# Patient Record
Sex: MALE | Race: WHITE | NOT HISPANIC OR LATINO | Employment: FULL TIME | URBAN - METROPOLITAN AREA
[De-identification: names, ages, dates, MRNs, and addresses within clinical notes are randomized per-mention and may not be internally consistent; named-entity substitution may affect disease eponyms.]

---

## 2023-10-26 PROBLEM — H00.013 HORDEOLUM OF RIGHT EYE: Status: ACTIVE | Noted: 2023-10-26

## 2023-10-26 PROBLEM — N52.9 MALE ERECTILE DISORDER: Status: ACTIVE | Noted: 2023-10-26

## 2023-10-26 PROBLEM — L02.421 FURUNCLE OF RIGHT AXILLA: Status: ACTIVE | Noted: 2023-10-26

## 2023-10-26 PROBLEM — N40.1 BPH WITH URINARY OBSTRUCTION: Status: ACTIVE | Noted: 2023-10-26

## 2023-10-26 PROBLEM — R35.1 NOCTURIA: Status: ACTIVE | Noted: 2023-10-26

## 2023-10-26 PROBLEM — N13.8 BPH WITH URINARY OBSTRUCTION: Status: ACTIVE | Noted: 2023-10-26

## 2023-10-26 PROBLEM — H01.003 BLEPHARITIS OF BOTH EYES: Status: ACTIVE | Noted: 2023-10-26

## 2023-10-26 PROBLEM — H01.006 BLEPHARITIS OF BOTH EYES: Status: ACTIVE | Noted: 2023-10-26

## 2023-10-26 PROBLEM — H00.021 HORDEOLUM INTERNUM OF RIGHT UPPER EYELID: Status: ACTIVE | Noted: 2023-10-26

## 2023-10-26 PROBLEM — N02.9 RECURRENT MICROSCOPIC HEMATURIA: Status: ACTIVE | Noted: 2023-10-26

## 2023-10-26 PROBLEM — L25.9 CONTACT DERMATITIS: Status: ACTIVE | Noted: 2023-10-26

## 2023-10-26 PROBLEM — R33.8 ACUTE URINARY RETENTION: Status: ACTIVE | Noted: 2023-10-26

## 2023-10-26 PROBLEM — H69.91 DYSFUNCTION OF RIGHT EUSTACHIAN TUBE: Status: ACTIVE | Noted: 2023-10-26

## 2023-10-26 RX ORDER — SILODOSIN 8 MG/1
8 CAPSULE ORAL NIGHTLY
COMMUNITY
Start: 2020-12-01

## 2023-10-26 RX ORDER — OMEPRAZOLE 20 MG/1
20 CAPSULE, DELAYED RELEASE ORAL
COMMUNITY
Start: 2022-03-26

## 2023-10-26 RX ORDER — DEXTROAMPHETAMINE SACCHARATE, AMPHETAMINE ASPARTATE MONOHYDRATE, DEXTROAMPHETAMINE SULFATE AND AMPHETAMINE SULFATE 6.25; 6.25; 6.25; 6.25 MG/1; MG/1; MG/1; MG/1
25 CAPSULE, EXTENDED RELEASE ORAL
COMMUNITY
Start: 2020-12-23

## 2023-10-26 RX ORDER — DEXTROAMPHETAMINE SACCHARATE, AMPHETAMINE ASPARTATE, DEXTROAMPHETAMINE SULFATE AND AMPHETAMINE SULFATE 7.5; 7.5; 7.5; 7.5 MG/1; MG/1; MG/1; MG/1
30 TABLET ORAL DAILY
COMMUNITY
Start: 2020-11-23

## 2023-10-26 RX ORDER — AMLODIPINE BESYLATE 5 MG/1
5 TABLET ORAL
COMMUNITY
Start: 2017-09-13

## 2023-10-26 RX ORDER — METFORMIN HYDROCHLORIDE 500 MG/1
2 TABLET, EXTENDED RELEASE ORAL DAILY
COMMUNITY
Start: 2017-05-17

## 2023-10-26 RX ORDER — TADALAFIL 5 MG/1
1 TABLET ORAL DAILY
COMMUNITY
Start: 2019-12-16 | End: 2023-10-27 | Stop reason: SDUPTHER

## 2023-10-26 RX ORDER — TESTOSTERONE CYPIONATE 200 MG/ML
0.7 INJECTION, SOLUTION INTRAMUSCULAR
COMMUNITY
Start: 2022-03-29

## 2023-10-26 RX ORDER — AMLODIPINE BESYLATE 10 MG/1
10 TABLET ORAL
COMMUNITY
Start: 2020-11-04

## 2023-10-27 ENCOUNTER — OFFICE VISIT (OUTPATIENT)
Dept: UROLOGY | Facility: CLINIC | Age: 58
End: 2023-10-27
Payer: COMMERCIAL

## 2023-10-27 VITALS
WEIGHT: 234.4 LBS | BODY MASS INDEX: 33.56 KG/M2 | TEMPERATURE: 97.1 F | DIASTOLIC BLOOD PRESSURE: 76 MMHG | SYSTOLIC BLOOD PRESSURE: 125 MMHG | HEART RATE: 101 BPM | HEIGHT: 70 IN

## 2023-10-27 DIAGNOSIS — N13.8 BPH WITH URINARY OBSTRUCTION: Primary | ICD-10-CM

## 2023-10-27 DIAGNOSIS — N52.9 MALE ERECTILE DISORDER: ICD-10-CM

## 2023-10-27 DIAGNOSIS — N40.1 BPH WITH URINARY OBSTRUCTION: Primary | ICD-10-CM

## 2023-10-27 PROCEDURE — 1036F TOBACCO NON-USER: CPT | Performed by: NURSE PRACTITIONER

## 2023-10-27 PROCEDURE — 99214 OFFICE O/P EST MOD 30 MIN: CPT | Performed by: NURSE PRACTITIONER

## 2023-10-27 RX ORDER — TADALAFIL 5 MG/1
5 TABLET ORAL DAILY
Qty: 90 TABLET | Refills: 3 | Status: SHIPPED | OUTPATIENT
Start: 2023-10-27 | End: 2024-10-21

## 2023-10-27 RX ORDER — SILODOSIN 8 MG/1
8 CAPSULE ORAL DAILY
Qty: 90 CAPSULE | Refills: 3 | Status: SHIPPED | OUTPATIENT
Start: 2023-10-27 | End: 2024-10-21

## 2023-10-27 RX ORDER — TADALAFIL 20 MG/1
20 TABLET ORAL
Qty: 30 TABLET | Refills: 3 | Status: SHIPPED | OUTPATIENT
Start: 2023-10-27 | End: 2024-06-23

## 2023-10-27 NOTE — PROGRESS NOTES
Subjective   Patient ID: Roscoe Man is a 58 y.o. male who presents for Benign Prostatic Hypertrophy and Follow-up.  Follow up of BPH and ED . He developed postoperative retention in Nov 2020 after surgical resolution of subdural hematoma. He has been compliant with daily silodosin as well as tadalafil. He was doing ISC temporarily which he has not completed since Dec 2020. Significant improvement in strength of stream and ability to empty. Doing well with silodosin AM and tadalafil PM.      Previously having priapism with 10/30/1 up to 30 units. Using 20/30/1 up to 30 units, only lasting up to 40 min. Pleased with quality of erection, desires erection to last longer.     PSA March 2020 4.4 and decreased to 3.8 in March 2021. Managed by age management MD who also manages his testosterone replacement. Currently injecting 0.7 mL weekly. PSA drawn this AM, lab still pending.      CT in 2022 identified small nonobstructing left kidney stone, asymptomatic Microscopic urinalysis identified 3-5 red blood cells per high-powered field. He denies gross hematuria. He has declined cystoscopy.             Review of Systems   All other systems reviewed and are negative.      Objective   Physical Exam  Vitals reviewed.     Alert and oriented x3  Moist mucous membranes  Breathes easily on room air  Abdomen soft, nondistended. Obese  No edema  No scleral icterus  No focal neurological deficits  Appears stated age, no acute distress  Declining LUIZ today     Assessment/Plan   Diagnoses and all orders for this visit:  BPH with urinary obstruction  -     silodosin (Rapaflo) 8 mg capsule; Take 1 capsule (8 mg) by mouth once daily.  -     tadalafil (Cialis) 5 mg tablet; Take 1 tablet (5 mg) by mouth once daily.  -     tadalafil (Cialis) 20 mg tablet; Take 1 tablet (20 mg) by mouth every other day if needed for erectile dysfunction.  Male erectile disorder    Increase ICI to 20/30/2. Continue with other interventions. Will contact me with  PSA result. Annual follow up with LUIZ

## 2024-08-27 ENCOUNTER — HOSPITAL ENCOUNTER (EMERGENCY)
Facility: HOSPITAL | Age: 59
Discharge: HOME | End: 2024-08-27
Attending: EMERGENCY MEDICINE
Payer: COMMERCIAL

## 2024-08-27 ENCOUNTER — APPOINTMENT (OUTPATIENT)
Dept: RADIOLOGY | Facility: HOSPITAL | Age: 59
End: 2024-08-27
Payer: COMMERCIAL

## 2024-08-27 VITALS
HEIGHT: 69 IN | RESPIRATION RATE: 17 BRPM | SYSTOLIC BLOOD PRESSURE: 154 MMHG | TEMPERATURE: 97.5 F | OXYGEN SATURATION: 96 % | HEART RATE: 96 BPM | DIASTOLIC BLOOD PRESSURE: 86 MMHG | WEIGHT: 234 LBS | BODY MASS INDEX: 34.66 KG/M2

## 2024-08-27 DIAGNOSIS — N20.1 URETERAL CALCULUS: ICD-10-CM

## 2024-08-27 DIAGNOSIS — N23 URETERAL COLIC: Primary | ICD-10-CM

## 2024-08-27 LAB
ALBUMIN SERPL BCP-MCNC: 4.2 G/DL (ref 3.4–5)
ALP SERPL-CCNC: 62 U/L (ref 33–136)
ALT SERPL W P-5'-P-CCNC: 22 U/L (ref 10–52)
ANION GAP SERPL CALC-SCNC: 15 MMOL/L (ref 10–20)
APPEARANCE UR: CLEAR
AST SERPL W P-5'-P-CCNC: 12 U/L (ref 9–39)
BASOPHILS # BLD AUTO: 0.04 X10*3/UL (ref 0–0.1)
BASOPHILS NFR BLD AUTO: 0.4 %
BILIRUB SERPL-MCNC: 0.5 MG/DL (ref 0–1.2)
BILIRUB UR STRIP.AUTO-MCNC: NEGATIVE MG/DL
BUN SERPL-MCNC: 23 MG/DL (ref 6–23)
CALCIUM SERPL-MCNC: 9.4 MG/DL (ref 8.6–10.3)
CHLORIDE SERPL-SCNC: 99 MMOL/L (ref 98–107)
CO2 SERPL-SCNC: 26 MMOL/L (ref 21–32)
COLOR UR: ABNORMAL
CREAT SERPL-MCNC: 1.27 MG/DL (ref 0.5–1.3)
EGFRCR SERPLBLD CKD-EPI 2021: 64 ML/MIN/1.73M*2
EOSINOPHIL # BLD AUTO: 0.05 X10*3/UL (ref 0–0.7)
EOSINOPHIL NFR BLD AUTO: 0.5 %
ERYTHROCYTE [DISTWIDTH] IN BLOOD BY AUTOMATED COUNT: 17.8 % (ref 11.5–14.5)
GLUCOSE SERPL-MCNC: 129 MG/DL (ref 74–99)
GLUCOSE UR STRIP.AUTO-MCNC: NORMAL MG/DL
HCT VFR BLD AUTO: 44.4 % (ref 41–52)
HGB BLD-MCNC: 13.8 G/DL (ref 13.5–17.5)
HOLD SPECIMEN: NORMAL
HOLD SPECIMEN: NORMAL
HYALINE CASTS #/AREA URNS AUTO: ABNORMAL /LPF
IMM GRANULOCYTES # BLD AUTO: 0.04 X10*3/UL (ref 0–0.7)
IMM GRANULOCYTES NFR BLD AUTO: 0.4 % (ref 0–0.9)
KETONES UR STRIP.AUTO-MCNC: NEGATIVE MG/DL
LEUKOCYTE ESTERASE UR QL STRIP.AUTO: NEGATIVE
LIPASE SERPL-CCNC: 58 U/L (ref 9–82)
LYMPHOCYTES # BLD AUTO: 3.5 X10*3/UL (ref 1.2–4.8)
LYMPHOCYTES NFR BLD AUTO: 35.4 %
MCH RBC QN AUTO: 19.9 PG (ref 26–34)
MCHC RBC AUTO-ENTMCNC: 31.1 G/DL (ref 32–36)
MCV RBC AUTO: 64 FL (ref 80–100)
MONOCYTES # BLD AUTO: 0.82 X10*3/UL (ref 0.1–1)
MONOCYTES NFR BLD AUTO: 8.3 %
MUCOUS THREADS #/AREA URNS AUTO: ABNORMAL /LPF
NEUTROPHILS # BLD AUTO: 5.44 X10*3/UL (ref 1.2–7.7)
NEUTROPHILS NFR BLD AUTO: 55 %
NITRITE UR QL STRIP.AUTO: NEGATIVE
NRBC BLD-RTO: 0 /100 WBCS (ref 0–0)
PH UR STRIP.AUTO: 6 [PH]
PLATELET # BLD AUTO: 252 X10*3/UL (ref 150–450)
POTASSIUM SERPL-SCNC: 3.8 MMOL/L (ref 3.5–5.3)
PROT SERPL-MCNC: 7.7 G/DL (ref 6.4–8.2)
PROT UR STRIP.AUTO-MCNC: ABNORMAL MG/DL
RBC # BLD AUTO: 6.94 X10*6/UL (ref 4.5–5.9)
RBC # UR STRIP.AUTO: ABNORMAL /UL
RBC #/AREA URNS AUTO: >20 /HPF
SODIUM SERPL-SCNC: 136 MMOL/L (ref 136–145)
SP GR UR STRIP.AUTO: 1.03
UROBILINOGEN UR STRIP.AUTO-MCNC: NORMAL MG/DL
WBC # BLD AUTO: 9.9 X10*3/UL (ref 4.4–11.3)
WBC #/AREA URNS AUTO: ABNORMAL /HPF

## 2024-08-27 PROCEDURE — 74176 CT ABD & PELVIS W/O CONTRAST: CPT | Performed by: RADIOLOGY

## 2024-08-27 PROCEDURE — 99284 EMERGENCY DEPT VISIT MOD MDM: CPT

## 2024-08-27 PROCEDURE — 36415 COLL VENOUS BLD VENIPUNCTURE: CPT | Performed by: EMERGENCY MEDICINE

## 2024-08-27 PROCEDURE — 74176 CT ABD & PELVIS W/O CONTRAST: CPT

## 2024-08-27 PROCEDURE — 83690 ASSAY OF LIPASE: CPT | Performed by: EMERGENCY MEDICINE

## 2024-08-27 PROCEDURE — 80053 COMPREHEN METABOLIC PANEL: CPT | Performed by: EMERGENCY MEDICINE

## 2024-08-27 PROCEDURE — 81001 URINALYSIS AUTO W/SCOPE: CPT | Performed by: EMERGENCY MEDICINE

## 2024-08-27 PROCEDURE — 85025 COMPLETE CBC W/AUTO DIFF WBC: CPT | Performed by: EMERGENCY MEDICINE

## 2024-08-27 RX ORDER — OXYCODONE HYDROCHLORIDE 5 MG/1
5 TABLET ORAL EVERY 6 HOURS PRN
Qty: 12 TABLET | Refills: 0 | Status: SHIPPED | OUTPATIENT
Start: 2024-08-27 | End: 2024-08-30

## 2024-08-27 RX ORDER — ONDANSETRON 4 MG/1
4 TABLET, FILM COATED ORAL EVERY 6 HOURS
Qty: 12 TABLET | Refills: 0 | Status: SHIPPED | OUTPATIENT
Start: 2024-08-27 | End: 2024-08-30

## 2024-08-27 ASSESSMENT — COLUMBIA-SUICIDE SEVERITY RATING SCALE - C-SSRS
2. HAVE YOU ACTUALLY HAD ANY THOUGHTS OF KILLING YOURSELF?: NO
6. HAVE YOU EVER DONE ANYTHING, STARTED TO DO ANYTHING, OR PREPARED TO DO ANYTHING TO END YOUR LIFE?: NO
1. IN THE PAST MONTH, HAVE YOU WISHED YOU WERE DEAD OR WISHED YOU COULD GO TO SLEEP AND NOT WAKE UP?: NO
2. HAVE YOU ACTUALLY HAD ANY THOUGHTS OF KILLING YOURSELF?: NO
1. IN THE PAST MONTH, HAVE YOU WISHED YOU WERE DEAD OR WISHED YOU COULD GO TO SLEEP AND NOT WAKE UP?: NO

## 2024-08-27 ASSESSMENT — PAIN DESCRIPTION - DESCRIPTORS: DESCRIPTORS: SHARP

## 2024-08-27 ASSESSMENT — LIFESTYLE VARIABLES
HAVE YOU EVER FELT YOU SHOULD CUT DOWN ON YOUR DRINKING: NO
HAVE PEOPLE ANNOYED YOU BY CRITICIZING YOUR DRINKING: NO
TOTAL SCORE: 0
EVER HAD A DRINK FIRST THING IN THE MORNING TO STEADY YOUR NERVES TO GET RID OF A HANGOVER: NO
EVER FELT BAD OR GUILTY ABOUT YOUR DRINKING: NO

## 2024-08-27 ASSESSMENT — PAIN DESCRIPTION - ORIENTATION: ORIENTATION: LEFT

## 2024-08-27 ASSESSMENT — PAIN SCALES - GENERAL
PAINLEVEL_OUTOF10: 6
PAINLEVEL_OUTOF10: 2

## 2024-08-27 ASSESSMENT — PAIN DESCRIPTION - DIRECTION: RADIATING_TOWARDS: LLQ

## 2024-08-27 ASSESSMENT — PAIN DESCRIPTION - LOCATION: LOCATION: BACK

## 2024-08-27 ASSESSMENT — PAIN DESCRIPTION - PAIN TYPE: TYPE: ACUTE PAIN

## 2024-08-27 ASSESSMENT — PAIN - FUNCTIONAL ASSESSMENT: PAIN_FUNCTIONAL_ASSESSMENT: 0-10

## 2024-08-27 NOTE — ED PROVIDER NOTES
HPI   Chief Complaint   Patient presents with    Flank Pain    Abdominal Pain     PT. ARRIVED VIA EMS TO ED FROM HOME FOR LT FLANK PAIN THAT RADIATES TO LLQ. PT. STATES BACK PAIN STARTED ON SATURDAY, WAS SEEN AT [facility], GIVEN STEROIDS/MUSCLE RELAXER, DENIES RELIEF. PT. STATES TONIGHT PAIN WAS 9/10, EMS ARRIVED AND GAVE 30MG OF TORADOL, PT. ARRIVED TO ED STATING 6/10 PAIN. PT. DENIES DYSURIA/FREQUENCY/URGENCY, N/V/D, CP, HA, SOB, NUMBNESS/TINGLING, DIZZINESS/LIGHTHEADEDNESS.       Patient is a 59-year-old male who presents the emergency department via EMS with chief complaint of left flank pain.  Patient states he has had pain that was located in his left back for the past 2 days.  States today, pain started wrapping around and radiating towards his left lower quadrant of his abdomen.  Patient was seen at urgent care, prescribed steroids and muscle relaxer.  States that his pain did not change following these medications.  Patient is denying fevers, chills, chest pain, shortness of breath, numbness, tingling, weakness, bowel or bladder incontinence.  Patient has not had any urinary symptoms including hematuria, frequency or hesitancy              Patient History   Past Medical History:   Diagnosis Date    Personal history of diseases of the skin and subcutaneous tissue 04/25/2018    History of sebaceous cyst     Past Surgical History:   Procedure Laterality Date    NASAL SEPTUM SURGERY  07/22/2014    Nasal Septal Deviation Repair    OTHER SURGICAL HISTORY  12/16/2019    Vasectomy     Family History   Problem Relation Name Age of Onset    No Known Problems Mother      No Known Problems Father       Social History     Tobacco Use    Smoking status: Never    Smokeless tobacco: Never   Substance Use Topics    Alcohol use: Not Currently    Drug use: Not Currently       Physical Exam   ED Triage Vitals [08/27/24 0200]   Temperature Heart Rate Respirations BP   36.4 °C (97.5 °F) (!) 105 20 (!) 167/105      Pulse Ox  Temp Source Heart Rate Source Patient Position   98 % Temporal -- Sitting      BP Location FiO2 (%)     Right arm --       Physical Exam  Vitals and nursing note reviewed.   Constitutional:       General: He is not in acute distress.     Appearance: He is not toxic-appearing.   HENT:      Head: Normocephalic and atraumatic.      Nose: Nose normal.      Mouth/Throat:      Mouth: Mucous membranes are moist.      Pharynx: Oropharynx is clear.   Eyes:      Extraocular Movements: Extraocular movements intact.      Conjunctiva/sclera: Conjunctivae normal.   Cardiovascular:      Rate and Rhythm: Normal rate and regular rhythm.      Heart sounds: No murmur heard.  Pulmonary:      Effort: Pulmonary effort is normal. No respiratory distress.      Breath sounds: Normal breath sounds.   Abdominal:      General: There is no distension.      Palpations: Abdomen is soft.      Tenderness: There is no abdominal tenderness. There is no guarding or rebound.   Musculoskeletal:         General: No tenderness or deformity. Normal range of motion.      Cervical back: Neck supple. No tenderness.   Skin:     General: Skin is warm and dry.      Capillary Refill: Capillary refill takes less than 2 seconds.      Findings: No rash.   Neurological:      General: No focal deficit present.      Mental Status: He is alert. Mental status is at baseline.   Psychiatric:         Mood and Affect: Mood normal.         Behavior: Behavior normal.           ED Course & MDM   Diagnoses as of 08/27/24 0438   Ureteral colic   Ureteral calculus                 No data recorded     [place] Coma Scale Score: 15 (08/27/24 0416 : Dawn Moncada RN)                           Medical Decision Making  Patient is seen and examined.  Patient did receive 30 mg of Toradol by EMS.  Pain had improved by the time he arrived to the emergency department.  Symptoms do seem consistent with ureteral calculi.  Lab work, urinalysis and CT imaging are ordered.  There is no  leukocytosis.  Kidney function is appropriate.  Urinalysis shows blood without evidence of infection.  On reexamination, patient is sleeping comfortably in bed.  CT imaging does show 8 mm left ureteral calculi that is obstructing and causing hydronephrosis.  Patient also has fecal material in the colon, consistent with an enteritis.  Prostate is enlarged.  Patient does follow with a nurse practitioner from urology.  Pain is improved to the point that the patient is able to sleep.  Patient is afebrile, no leukocytosis and no infection.  Patient is comfortable to plan for discharge.  Patient will be discharged with prescription for pain medications.  He already takes Flomax.  It is recommended that he follows up with his urologist and his primary care physician.  Patient is given strict return precautions.  Patient understands and agrees with discharge plan.        Procedure  Procedures     Jason Santoyo,   08/27/24 0438

## 2024-08-27 NOTE — DISCHARGE INSTRUCTIONS
Follow-up with your primary care physician and with urology.  Take medications as prescribed.  Do not drive or operate machinery while taking narcotic pain medications.  Seek immediate medical attention with any worsening symptoms, pain not controlled with pain medication, fevers, vomiting or for any reason

## 2024-09-05 ENCOUNTER — HOSPITAL ENCOUNTER (OUTPATIENT)
Dept: RADIOLOGY | Facility: HOSPITAL | Age: 59
Discharge: HOME | End: 2024-09-05
Payer: COMMERCIAL

## 2024-09-05 DIAGNOSIS — N20.0 CALCULUS OF KIDNEY: ICD-10-CM

## 2024-09-05 PROCEDURE — 74018 RADEX ABDOMEN 1 VIEW: CPT

## 2024-10-24 NOTE — H&P (VIEW-ONLY)
Subjective   Patient ID: Roscoe Man is a 59 y.o. male new patient kindly referred by ER who presents for Nephrolithiasis.    HPI    Patient has 8 mm left ureteral calculi.  Previously presented to another urologist and had a stent placement.  He subsequently had shockwave lithotripsy and stent removal.  Shortly after the procedure, developed severe pain and ultimately have the stent replaced.  He is seeking a second opinion for treatment of the stone.  Currently reports left flank pain with intermittent hematuria.  Denies fever, chills, nausea, or vomiting.  No prior history of stones.    CT A/P wo contrast  IMPRESSION:  8 mm obstructing left ureteral calculi with moderate left  hydroureteronephrosis.      Mild nonspecific fluid-filled loops of small bowel which are mildly  distended as well as feculent debris within distal loops of small  bowel. Correlation for ileus or enteritis suggested.      Probable renal cysts including cyst with minimal peripheral  calcifications.      Prostatomegaly. Correlation with PSA is suggested.      Diverticulosis, minimal right nephrolithiasis and additional findings  as detailed.      Past Medical History  Past Medical History:   Diagnosis Date    Personal history of diseases of the skin and subcutaneous tissue 04/25/2018    History of sebaceous cyst        Surgical History  Past Surgical History:   Procedure Laterality Date    NASAL SEPTUM SURGERY  07/22/2014    Nasal Septal Deviation Repair    OTHER SURGICAL HISTORY  12/16/2019    Vasectomy         Social History  He reports that he has never smoked. He has never used smokeless tobacco. He reports that he does not currently use alcohol. He reports that he does not currently use drugs.    Family History  Family History   Problem Relation Name Age of Onset    No Known Problems Mother      No Known Problems Father         Medications    Current Outpatient Medications:     amLODIPine (Norvasc) 10 mg tablet, Take 1 tablet (10 mg) by  mouth., Disp: , Rfl:     amphetamine-dextroamphetamine (AdderalL) 30 mg tablet, Take 1 tablet (30 mg) by mouth once daily., Disp: , Rfl:     metFORMIN  mg 24 hr tablet, Take 2 tablets (1,000 mg) by mouth once daily., Disp: , Rfl:     silodosin (Rapaflo) 8 mg capsule, Take 1 capsule (8 mg) by mouth once daily at bedtime., Disp: 90 capsule, Rfl: 3    tadalafil (Cialis) 20 mg tablet, Take 1 tablet (20 mg) by mouth every other day if needed for erectile dysfunction., Disp: 30 tablet, Rfl: 3    tadalafil (Cialis) 5 mg tablet, Take 1 tablet (5 mg) by mouth once daily., Disp: 90 tablet, Rfl: 3    testosterone cypionate (Depo-Testosterone) 200 mg/mL injection, Inject 0.7 mL (140 mg) into the muscle 1 (one) time per week., Disp: , Rfl:      Allergies  Iodinated contrast media and Morphine     Review of Systems  A 12 system review was completed and is negative with the exception of those signs and symptoms noted in the history of present illness.    Objective   Physical Exam  General: in NAD, appears stated age  Head: normocephalic, atraumatic  Neck: supple; trachea is midline  Respiratory: normal effort, no use of accessory muscles  Cardiovascular: no peripheral edema  Abdomen: soft, nondistended, nontender, no rebound or guarding, no organomegaly, no CVA tenderness, no hernia  Lymphatic: no lymphadenopathy noted  Skin: normal turgor, no rashes  Neurologic: grossly intact, oriented to person/place/time  Psychiatric: mode and affect appropriate  : normal phallus, normal meatus, scrotum normal, testicles normal bilaterally, epididymis normal bilaterally  LUIZ: normal tone, no hemorrhoids, prostate smooth/nontender/no nodules    Assessment/Plan   Diagnoses and all orders for this visit:  Calculus of ureter  -     Case Request Operating Room: Ureteral Lithotripsy Laser; Standing  -     Urine Culture; Future  -     Basic Metabolic Panel; Future  -     CBC; Future  -     ECG 12 lead; Future  -     Request for Pre-Admission  Testing Visit; Future  Other orders  -     Place in outpatient/hospital ambulatory surgery; Standing  -     Full code; Standing  -     Vital Signs; Standing  -     Pulse oximetry, spot; Standing  -     NPO Diet Except: Sips with meds; Effective now; Standing  -     Height and weight; Standing  -     Insert and maintain peripheral IV; Standing  -     Saline lock IV; Standing  -     Type And Screen; Standing  -     Notify physician; Standing  -     ceFAZolin (Ancef) 2 g in dextrose (iso)  mL    We discussed a definitive surgical plan for his 8 mm left ureteral calculus.  We discussed ureteroscopy, laser lithotripsy, and stent exchange.  I described the procedure and the plan of action.  He is agreeable to proceed.  We will tentatively schedule for November 8.  He is aware that he may need the stent replaced after ureteroscopy.  We will then decide when stent removal can take place pending my ureteroscopic findings.    Scribe Attestation  By signing my name below, I, Hood Callejas   attest that this documentation has been prepared under the direction and in the presence of Alexander Tirado MD.    Scribe Attestation  By signing my name below, IMadeleine Scribe   attest that this documentation has been prepared under the direction and in the presence of Alexander Tirado MD.

## 2024-10-28 ENCOUNTER — APPOINTMENT (OUTPATIENT)
Dept: UROLOGY | Facility: CLINIC | Age: 59
End: 2024-10-28
Payer: COMMERCIAL

## 2024-10-29 ENCOUNTER — APPOINTMENT (OUTPATIENT)
Dept: UROLOGY | Facility: CLINIC | Age: 59
End: 2024-10-29
Payer: COMMERCIAL

## 2024-10-29 ENCOUNTER — OFFICE VISIT (OUTPATIENT)
Dept: UROLOGY | Facility: CLINIC | Age: 59
End: 2024-10-29
Payer: COMMERCIAL

## 2024-10-29 VITALS
WEIGHT: 233.4 LBS | TEMPERATURE: 98.1 F | DIASTOLIC BLOOD PRESSURE: 82 MMHG | BODY MASS INDEX: 34.47 KG/M2 | HEART RATE: 94 BPM | SYSTOLIC BLOOD PRESSURE: 134 MMHG

## 2024-10-29 VITALS — SYSTOLIC BLOOD PRESSURE: 130 MMHG | DIASTOLIC BLOOD PRESSURE: 79 MMHG | TEMPERATURE: 97.2 F | HEART RATE: 101 BPM

## 2024-10-29 DIAGNOSIS — N13.8 BPH WITH URINARY OBSTRUCTION: Primary | ICD-10-CM

## 2024-10-29 DIAGNOSIS — N20.0 KIDNEY STONE: ICD-10-CM

## 2024-10-29 DIAGNOSIS — N40.1 BPH WITH URINARY OBSTRUCTION: Primary | ICD-10-CM

## 2024-10-29 DIAGNOSIS — N20.1 CALCULUS OF URETER: Primary | ICD-10-CM

## 2024-10-29 LAB
APPEARANCE UR: ABNORMAL
BILIRUB UR STRIP.AUTO-MCNC: NEGATIVE MG/DL
COLOR UR: ABNORMAL
GLUCOSE UR STRIP.AUTO-MCNC: NORMAL MG/DL
HOLD SPECIMEN: NORMAL
HYALINE CASTS #/AREA URNS AUTO: ABNORMAL /LPF
KETONES UR STRIP.AUTO-MCNC: NEGATIVE MG/DL
LEUKOCYTE ESTERASE UR QL STRIP.AUTO: ABNORMAL
MUCOUS THREADS #/AREA URNS AUTO: ABNORMAL /LPF
NITRITE UR QL STRIP.AUTO: NEGATIVE
PH UR STRIP.AUTO: 6 [PH]
POC APPEARANCE, URINE: ABNORMAL
POC BILIRUBIN, URINE: NEGATIVE
POC BLOOD, URINE: ABNORMAL
POC COLOR, URINE: ABNORMAL
POC GLUCOSE, URINE: NEGATIVE MG/DL
POC KETONES, URINE: NEGATIVE MG/DL
POC LEUKOCYTES, URINE: ABNORMAL
POC NITRITE,URINE: NEGATIVE
POC PH, URINE: 6 PH
POC PROTEIN, URINE: ABNORMAL MG/DL
POC SPECIFIC GRAVITY, URINE: 1.02
POC UROBILINOGEN, URINE: 0.2 EU/DL
PROT UR STRIP.AUTO-MCNC: ABNORMAL MG/DL
RBC # UR STRIP.AUTO: ABNORMAL /UL
RBC #/AREA URNS AUTO: >20 /HPF
SP GR UR STRIP.AUTO: 1.02
UROBILINOGEN UR STRIP.AUTO-MCNC: NORMAL MG/DL
WBC #/AREA URNS AUTO: ABNORMAL /HPF

## 2024-10-29 PROCEDURE — 81001 URINALYSIS AUTO W/SCOPE: CPT

## 2024-10-29 PROCEDURE — 99214 OFFICE O/P EST MOD 30 MIN: CPT | Performed by: NURSE PRACTITIONER

## 2024-10-29 PROCEDURE — G2211 COMPLEX E/M VISIT ADD ON: HCPCS | Performed by: NURSE PRACTITIONER

## 2024-10-29 PROCEDURE — 99214 OFFICE O/P EST MOD 30 MIN: CPT | Performed by: UROLOGY

## 2024-10-29 PROCEDURE — 81003 URINALYSIS AUTO W/O SCOPE: CPT | Performed by: NURSE PRACTITIONER

## 2024-10-29 PROCEDURE — 87086 URINE CULTURE/COLONY COUNT: CPT

## 2024-10-29 RX ORDER — TADALAFIL 5 MG/1
5 TABLET ORAL DAILY
Qty: 90 TABLET | Refills: 3 | Status: SHIPPED | OUTPATIENT
Start: 2024-10-29 | End: 2025-10-24

## 2024-10-29 RX ORDER — CEFAZOLIN SODIUM 2 G/100ML
2 INJECTION, SOLUTION INTRAVENOUS ONCE
OUTPATIENT
Start: 2024-10-29 | End: 2024-10-29

## 2024-10-29 RX ORDER — TADALAFIL 20 MG/1
20 TABLET ORAL
Qty: 30 TABLET | Refills: 3 | Status: SHIPPED | OUTPATIENT
Start: 2024-10-29 | End: 2025-06-26

## 2024-10-29 RX ORDER — SILODOSIN 8 MG/1
8 CAPSULE ORAL NIGHTLY
Qty: 90 CAPSULE | Refills: 3 | Status: SHIPPED | OUTPATIENT
Start: 2024-10-29 | End: 2025-10-29

## 2024-10-31 LAB — BACTERIA UR CULT: NORMAL

## 2024-10-31 NOTE — PREPROCEDURE INSTRUCTIONS

## 2024-11-04 ENCOUNTER — HOSPITAL ENCOUNTER (OUTPATIENT)
Dept: CARDIOLOGY | Facility: HOSPITAL | Age: 59
Discharge: HOME | End: 2024-11-04
Payer: COMMERCIAL

## 2024-11-04 ENCOUNTER — LAB (OUTPATIENT)
Dept: LAB | Facility: LAB | Age: 59
End: 2024-11-04
Payer: COMMERCIAL

## 2024-11-04 DIAGNOSIS — N20.1 CALCULUS OF URETER: ICD-10-CM

## 2024-11-04 LAB
ANION GAP SERPL CALC-SCNC: 10 MMOL/L (ref 10–20)
ATRIAL RATE: 74 BPM
BUN SERPL-MCNC: 12 MG/DL (ref 6–23)
CALCIUM SERPL-MCNC: 8.9 MG/DL (ref 8.6–10.3)
CHLORIDE SERPL-SCNC: 100 MMOL/L (ref 98–107)
CO2 SERPL-SCNC: 30 MMOL/L (ref 21–32)
CREAT SERPL-MCNC: 1.01 MG/DL (ref 0.5–1.3)
EGFRCR SERPLBLD CKD-EPI 2021: 85 ML/MIN/1.73M*2
ERYTHROCYTE [DISTWIDTH] IN BLOOD BY AUTOMATED COUNT: 18.6 % (ref 11.5–14.5)
GLUCOSE SERPL-MCNC: 130 MG/DL (ref 74–99)
HCT VFR BLD AUTO: 44.3 % (ref 41–52)
HGB BLD-MCNC: 13.3 G/DL (ref 13.5–17.5)
MCH RBC QN AUTO: 19.8 PG (ref 26–34)
MCHC RBC AUTO-ENTMCNC: 30 G/DL (ref 32–36)
MCV RBC AUTO: 66 FL (ref 80–100)
NRBC BLD-RTO: 0 /100 WBCS (ref 0–0)
P AXIS: 21 DEGREES
P OFFSET: 201 MS
P ONSET: 143 MS
PLATELET # BLD AUTO: 237 X10*3/UL (ref 150–450)
POTASSIUM SERPL-SCNC: 4.3 MMOL/L (ref 3.5–5.3)
PR INTERVAL: 154 MS
Q ONSET: 220 MS
QRS COUNT: 12 BEATS
QRS DURATION: 88 MS
QT INTERVAL: 356 MS
QTC CALCULATION(BAZETT): 395 MS
QTC FREDERICIA: 382 MS
R AXIS: -26 DEGREES
RBC # BLD AUTO: 6.72 X10*6/UL (ref 4.5–5.9)
SODIUM SERPL-SCNC: 136 MMOL/L (ref 136–145)
T AXIS: 10 DEGREES
T OFFSET: 398 MS
VENTRICULAR RATE: 74 BPM
WBC # BLD AUTO: 6.5 X10*3/UL (ref 4.4–11.3)

## 2024-11-04 PROCEDURE — 85027 COMPLETE CBC AUTOMATED: CPT

## 2024-11-04 PROCEDURE — 87086 URINE CULTURE/COLONY COUNT: CPT

## 2024-11-04 PROCEDURE — 93010 ELECTROCARDIOGRAM REPORT: CPT | Performed by: INTERNAL MEDICINE

## 2024-11-04 PROCEDURE — 36415 COLL VENOUS BLD VENIPUNCTURE: CPT

## 2024-11-04 PROCEDURE — 93005 ELECTROCARDIOGRAM TRACING: CPT

## 2024-11-04 PROCEDURE — 80048 BASIC METABOLIC PNL TOTAL CA: CPT

## 2024-11-05 LAB — BACTERIA UR CULT: NORMAL

## 2024-11-08 ENCOUNTER — ANESTHESIA EVENT (OUTPATIENT)
Dept: OPERATING ROOM | Facility: HOSPITAL | Age: 59
End: 2024-11-08
Payer: COMMERCIAL

## 2024-11-08 ENCOUNTER — APPOINTMENT (OUTPATIENT)
Dept: RADIOLOGY | Facility: HOSPITAL | Age: 59
End: 2024-11-08
Payer: COMMERCIAL

## 2024-11-08 ENCOUNTER — HOSPITAL ENCOUNTER (OUTPATIENT)
Facility: HOSPITAL | Age: 59
Setting detail: OUTPATIENT SURGERY
Discharge: HOME | End: 2024-11-08
Attending: UROLOGY | Admitting: UROLOGY
Payer: COMMERCIAL

## 2024-11-08 ENCOUNTER — ANESTHESIA (OUTPATIENT)
Dept: OPERATING ROOM | Facility: HOSPITAL | Age: 59
End: 2024-11-08
Payer: COMMERCIAL

## 2024-11-08 VITALS
OXYGEN SATURATION: 98 % | RESPIRATION RATE: 16 BRPM | TEMPERATURE: 96.6 F | DIASTOLIC BLOOD PRESSURE: 90 MMHG | HEIGHT: 69 IN | SYSTOLIC BLOOD PRESSURE: 155 MMHG | HEART RATE: 79 BPM | BODY MASS INDEX: 34.06 KG/M2 | WEIGHT: 229.94 LBS

## 2024-11-08 DIAGNOSIS — N20.1 CALCULUS OF URETER: ICD-10-CM

## 2024-11-08 DIAGNOSIS — N20.0 KIDNEY STONE: ICD-10-CM

## 2024-11-08 PROBLEM — E11.9 DIABETES MELLITUS, TYPE 2 (MULTI): Status: ACTIVE | Noted: 2024-11-08

## 2024-11-08 PROBLEM — I10 HTN (HYPERTENSION): Status: ACTIVE | Noted: 2024-11-08

## 2024-11-08 LAB
GLUCOSE BLD MANUAL STRIP-MCNC: 108 MG/DL (ref 74–99)
GLUCOSE BLD MANUAL STRIP-MCNC: 113 MG/DL (ref 74–99)

## 2024-11-08 PROCEDURE — 3600000004 HC OR TIME - INITIAL BASE CHARGE - PROCEDURE LEVEL FOUR: Performed by: UROLOGY

## 2024-11-08 PROCEDURE — 3600000009 HC OR TIME - EACH INCREMENTAL 1 MINUTE - PROCEDURE LEVEL FOUR: Performed by: UROLOGY

## 2024-11-08 PROCEDURE — 7100000010 HC PHASE TWO TIME - EACH INCREMENTAL 1 MINUTE: Performed by: UROLOGY

## 2024-11-08 PROCEDURE — 82947 ASSAY GLUCOSE BLOOD QUANT: CPT

## 2024-11-08 PROCEDURE — 7100000009 HC PHASE TWO TIME - INITIAL BASE CHARGE: Performed by: UROLOGY

## 2024-11-08 PROCEDURE — 2550000001 HC RX 255 CONTRASTS: Performed by: UROLOGY

## 2024-11-08 PROCEDURE — 7100000001 HC RECOVERY ROOM TIME - INITIAL BASE CHARGE: Performed by: UROLOGY

## 2024-11-08 PROCEDURE — 2720000007 HC OR 272 NO HCPCS: Performed by: UROLOGY

## 2024-11-08 PROCEDURE — 3700000001 HC GENERAL ANESTHESIA TIME - INITIAL BASE CHARGE: Performed by: UROLOGY

## 2024-11-08 PROCEDURE — 52356 CYSTO/URETERO W/LITHOTRIPSY: CPT | Performed by: UROLOGY

## 2024-11-08 PROCEDURE — 82365 CALCULUS SPECTROSCOPY: CPT | Performed by: UROLOGY

## 2024-11-08 PROCEDURE — 74420 UROGRAPHY RTRGR +-KUB: CPT

## 2024-11-08 PROCEDURE — 2500000004 HC RX 250 GENERAL PHARMACY W/ HCPCS (ALT 636 FOR OP/ED): Mod: JZ | Performed by: UROLOGY

## 2024-11-08 PROCEDURE — C1758 CATHETER, URETERAL: HCPCS | Performed by: UROLOGY

## 2024-11-08 PROCEDURE — C1769 GUIDE WIRE: HCPCS | Performed by: UROLOGY

## 2024-11-08 PROCEDURE — 74420 UROGRAPHY RTRGR +-KUB: CPT | Performed by: UROLOGY

## 2024-11-08 PROCEDURE — 2500000004 HC RX 250 GENERAL PHARMACY W/ HCPCS (ALT 636 FOR OP/ED): Performed by: STUDENT IN AN ORGANIZED HEALTH CARE EDUCATION/TRAINING PROGRAM

## 2024-11-08 PROCEDURE — 7100000002 HC RECOVERY ROOM TIME - EACH INCREMENTAL 1 MINUTE: Performed by: UROLOGY

## 2024-11-08 PROCEDURE — 2500000005 HC RX 250 GENERAL PHARMACY W/O HCPCS: Performed by: UROLOGY

## 2024-11-08 PROCEDURE — 2780000003 HC OR 278 NO HCPCS: Performed by: UROLOGY

## 2024-11-08 PROCEDURE — 2500000004 HC RX 250 GENERAL PHARMACY W/ HCPCS (ALT 636 FOR OP/ED): Performed by: NURSE ANESTHETIST, CERTIFIED REGISTERED

## 2024-11-08 PROCEDURE — C2617 STENT, NON-COR, TEM W/O DEL: HCPCS | Performed by: UROLOGY

## 2024-11-08 PROCEDURE — 3700000002 HC GENERAL ANESTHESIA TIME - EACH INCREMENTAL 1 MINUTE: Performed by: UROLOGY

## 2024-11-08 PROCEDURE — C1894 INTRO/SHEATH, NON-LASER: HCPCS | Performed by: UROLOGY

## 2024-11-08 DEVICE — INLAY OPTIMA URETERAL STENT W/O GUIDEWIRE
Type: IMPLANTABLE DEVICE | Site: URETER | Status: FUNCTIONAL
Brand: BARD® INLAY OPTIMA® URETERAL STENT

## 2024-11-08 RX ORDER — CEPHALEXIN 500 MG/1
500 CAPSULE ORAL 3 TIMES DAILY
Qty: 9 CAPSULE | Refills: 0 | Status: SHIPPED | OUTPATIENT
Start: 2024-11-08 | End: 2024-11-11

## 2024-11-08 RX ORDER — CEFAZOLIN SODIUM 2 G/100ML
2 INJECTION, SOLUTION INTRAVENOUS ONCE
Status: COMPLETED | OUTPATIENT
Start: 2024-11-08 | End: 2024-11-08

## 2024-11-08 RX ORDER — DROPERIDOL 2.5 MG/ML
0.62 INJECTION, SOLUTION INTRAMUSCULAR; INTRAVENOUS ONCE AS NEEDED
Status: DISCONTINUED | OUTPATIENT
Start: 2024-11-08 | End: 2024-11-08 | Stop reason: HOSPADM

## 2024-11-08 RX ORDER — ONDANSETRON HYDROCHLORIDE 2 MG/ML
INJECTION, SOLUTION INTRAVENOUS AS NEEDED
Status: DISCONTINUED | OUTPATIENT
Start: 2024-11-08 | End: 2024-11-08

## 2024-11-08 RX ORDER — FENTANYL CITRATE 50 UG/ML
INJECTION, SOLUTION INTRAMUSCULAR; INTRAVENOUS CONTINUOUS PRN
Status: DISCONTINUED | OUTPATIENT
Start: 2024-11-08 | End: 2024-11-08

## 2024-11-08 RX ORDER — FENTANYL CITRATE 50 UG/ML
50 INJECTION, SOLUTION INTRAMUSCULAR; INTRAVENOUS EVERY 5 MIN PRN
Status: DISCONTINUED | OUTPATIENT
Start: 2024-11-08 | End: 2024-11-08 | Stop reason: HOSPADM

## 2024-11-08 RX ORDER — DIPHENHYDRAMINE HYDROCHLORIDE 50 MG/ML
INJECTION INTRAMUSCULAR; INTRAVENOUS AS NEEDED
Status: DISCONTINUED | OUTPATIENT
Start: 2024-11-08 | End: 2024-11-08

## 2024-11-08 RX ORDER — SODIUM CHLORIDE, SODIUM LACTATE, POTASSIUM CHLORIDE, CALCIUM CHLORIDE 600; 310; 30; 20 MG/100ML; MG/100ML; MG/100ML; MG/100ML
100 INJECTION, SOLUTION INTRAVENOUS CONTINUOUS
Status: DISCONTINUED | OUTPATIENT
Start: 2024-11-08 | End: 2024-11-08 | Stop reason: HOSPADM

## 2024-11-08 RX ORDER — PROPOFOL 10 MG/ML
INJECTION, EMULSION INTRAVENOUS AS NEEDED
Status: DISCONTINUED | OUTPATIENT
Start: 2024-11-08 | End: 2024-11-08

## 2024-11-08 RX ORDER — DIPHENHYDRAMINE HYDROCHLORIDE 50 MG/ML
12.5 INJECTION INTRAMUSCULAR; INTRAVENOUS ONCE AS NEEDED
Status: DISCONTINUED | OUTPATIENT
Start: 2024-11-08 | End: 2024-11-08 | Stop reason: HOSPADM

## 2024-11-08 RX ORDER — LIDOCAINE HYDROCHLORIDE 10 MG/ML
0.1 INJECTION, SOLUTION EPIDURAL; INFILTRATION; INTRACAUDAL; PERINEURAL ONCE
Status: DISCONTINUED | OUTPATIENT
Start: 2024-11-08 | End: 2024-11-08 | Stop reason: HOSPADM

## 2024-11-08 RX ORDER — OXYCODONE HYDROCHLORIDE 5 MG/1
5 TABLET ORAL EVERY 6 HOURS PRN
Qty: 12 TABLET | Refills: 0 | Status: SHIPPED | OUTPATIENT
Start: 2024-11-08 | End: 2024-11-15

## 2024-11-08 RX ORDER — LIDOCAINE HYDROCHLORIDE 20 MG/ML
INJECTION, SOLUTION INFILTRATION; PERINEURAL AS NEEDED
Status: DISCONTINUED | OUTPATIENT
Start: 2024-11-08 | End: 2024-11-08

## 2024-11-08 RX ORDER — FAMOTIDINE 10 MG/ML
INJECTION INTRAVENOUS AS NEEDED
Status: DISCONTINUED | OUTPATIENT
Start: 2024-11-08 | End: 2024-11-08

## 2024-11-08 RX ORDER — LABETALOL HYDROCHLORIDE 5 MG/ML
5 INJECTION, SOLUTION INTRAVENOUS ONCE AS NEEDED
Status: DISCONTINUED | OUTPATIENT
Start: 2024-11-08 | End: 2024-11-08 | Stop reason: HOSPADM

## 2024-11-08 RX ORDER — MEPERIDINE HYDROCHLORIDE 25 MG/ML
12.5 INJECTION INTRAMUSCULAR; INTRAVENOUS; SUBCUTANEOUS EVERY 10 MIN PRN
Status: DISCONTINUED | OUTPATIENT
Start: 2024-11-08 | End: 2024-11-08 | Stop reason: HOSPADM

## 2024-11-08 RX ORDER — SODIUM CHLORIDE 0.9 G/100ML
IRRIGANT IRRIGATION AS NEEDED
Status: DISCONTINUED | OUTPATIENT
Start: 2024-11-08 | End: 2024-11-08 | Stop reason: HOSPADM

## 2024-11-08 RX ORDER — KETOROLAC TROMETHAMINE 30 MG/ML
INJECTION, SOLUTION INTRAMUSCULAR; INTRAVENOUS AS NEEDED
Status: DISCONTINUED | OUTPATIENT
Start: 2024-11-08 | End: 2024-11-08

## 2024-11-08 RX ORDER — PHENAZOPYRIDINE HYDROCHLORIDE 200 MG/1
200 TABLET, FILM COATED ORAL 3 TIMES DAILY PRN
Qty: 9 TABLET | Refills: 0 | Status: SHIPPED | OUTPATIENT
Start: 2024-11-08 | End: 2024-11-11

## 2024-11-08 RX ORDER — MIDAZOLAM HYDROCHLORIDE 1 MG/ML
INJECTION, SOLUTION INTRAMUSCULAR; INTRAVENOUS AS NEEDED
Status: DISCONTINUED | OUTPATIENT
Start: 2024-11-08 | End: 2024-11-08

## 2024-11-08 ASSESSMENT — PAIN SCALES - GENERAL
PAINLEVEL_OUTOF10: 0 - NO PAIN
PAIN_LEVEL: 0
PAINLEVEL_OUTOF10: 0 - NO PAIN

## 2024-11-08 ASSESSMENT — COLUMBIA-SUICIDE SEVERITY RATING SCALE - C-SSRS
6. HAVE YOU EVER DONE ANYTHING, STARTED TO DO ANYTHING, OR PREPARED TO DO ANYTHING TO END YOUR LIFE?: NO
2. HAVE YOU ACTUALLY HAD ANY THOUGHTS OF KILLING YOURSELF?: NO
1. IN THE PAST MONTH, HAVE YOU WISHED YOU WERE DEAD OR WISHED YOU COULD GO TO SLEEP AND NOT WAKE UP?: NO

## 2024-11-08 ASSESSMENT — PAIN - FUNCTIONAL ASSESSMENT
PAIN_FUNCTIONAL_ASSESSMENT: 0-10

## 2024-11-08 NOTE — ANESTHESIA PREPROCEDURE EVALUATION
Roscoe Man is a 59 y.o. male here for:      Ureteral Lithotripsy Laser  With Alexander Tirado MD  Pre-Op Diagnosis Codes:      * Urinary tract stone [N20.1]    Lab Results   Component Value Date    HGB 13.3 (L) 11/04/2024    HCT 44.3 11/04/2024    WBC 6.5 11/04/2024     11/04/2024     11/04/2024    K 4.3 11/04/2024     11/04/2024    CREATININE 1.01 11/04/2024    BUN 12 11/04/2024       Social History     Substance and Sexual Activity   Drug Use Not Currently      Tobacco Use: Low Risk  (11/8/2024)    Patient History     Smoking Tobacco Use: Never     Smokeless Tobacco Use: Never     Passive Exposure: Not on file      Social History     Substance and Sexual Activity   Alcohol Use Not Currently        Allergies   Allergen Reactions    Iodinated Contrast Media Hives    Morphine GI Upset     HARD TIME BREATHING       Current Outpatient Medications   Medication Instructions    amLODIPine (NORVASC) 10 mg    amphetamine-dextroamphetamine (AdderalL) 30 mg tablet 30 mg, Daily    metFORMIN  mg 24 hr tablet 2 tablets, Daily    silodosin (RAPAFLO) 8 mg, oral, Nightly    SITagliptin phosphate (JANUVIA) 25 mg, oral, Daily    tadalafil (CIALIS) 5 mg, oral, Daily    tadalafil (CIALIS) 20 mg, oral, Every 48 hours PRN    testosterone cypionate (Depo-Testosterone) 200 mg/mL injection 0.7 mL, Once Weekly       Past Medical History:   Diagnosis Date    ADHD (attention deficit hyperactivity disorder)     BPH (benign prostatic hyperplasia)     COVID-19     VACCINATED    DVT (deep venous thrombosis) (Multi)     Hematuria     Hypertension     Nephrolithiasis     Personal history of diseases of the skin and subcutaneous tissue 04/25/2018    History of sebaceous cyst    Type 2 diabetes mellitus     Urinary retention     Wears glasses        Past Surgical History:   Procedure Laterality Date    ACHILLES TENDON SURGERY Right     CHOLECYSTECTOMY      LITHOTRIPSY      NASAL SEPTUM SURGERY  07/22/2014    Nasal Septal  "Deviation Repair    OTHER SURGICAL HISTORY  12/16/2019    Vasectomy    OTHER SURGICAL HISTORY      RIGHT SHOULDER REPAIR    URETEROSCOPY         Family History   Problem Relation Name Age of Onset    No Known Problems Mother      No Known Problems Father         Relevant Problems   Cardiac   (+) HTN (hypertension)      /Renal   (+) BPH with urinary obstruction      Endocrine   (+) Diabetes mellitus, type 2 (Multi)      ID   (+) Furuncle of right axilla       Visit Vitals  /88 Comment: RIGHT UPPER ARM   Pulse 83   Temp 36.3 °C (97.3 °F) (Temporal)   Resp 16   Ht 1.753 m (5' 9\")   Wt 104 kg (229 lb 15 oz)   SpO2 99%   BMI 33.96 kg/m²   Smoking Status Never   BSA 2.25 m²       NPO Details:  NPO/Void Status  Carbohydrate Drink Given Prior to Surgery? : N  Date of Last Liquid: 11/08/24  Time of Last Liquid: 0700  Date of Last Solid: 11/07/24  Time of Last Solid: 2200  Last Intake Type: Clear fluids  Time of Last Void: 1100        Physical Exam    Airway  Mallampati: II     Cardiovascular - normal exam     Dental - normal exam     Pulmonary - normal exam     Abdominal - normal exam  (+) obese  Abdomen: soft             Anesthesia Plan    History of general anesthesia?: yes  History of complications of general anesthesia?: no    ASA 3     general     intravenous induction   Anesthetic plan and risks discussed with patient.    Plan discussed with CRNA.          "

## 2024-11-08 NOTE — OP NOTE
Date: 2024  OR Location: [facility]    Name: Roscoe Man, : 1965, Age: 59 y.o., MRN: [id number], Sex: male    Diagnosis  Pre-op Diagnosis      * Calculus of ureter [N20.1] Post-op Diagnosis     * Calculus of ureter [N20.1]     Procedures  1.  Cystoscopy and left ureteral stent removal  2.  Left ureteroscopy and holmium laser lithotripsy  3.  Left ureteroscopic stone basket extraction  4.  Left double-J stent replacement    Surgeons      * Alexander Tirado - Primary    Resident/Fellow/Other Assistant:  Surgeons and Role:  * No surgeons found with a matching role *    Procedure Summary  Anesthesia: General  ASA: III  Anesthesia Staff:   Anesthesiologist: Adrian Wisdom MD  CRNA: DEBBIE Smallwood-CRNA  Estimated Blood Loss: Minimal  Intra-op Medications:   Medication Name Total Dose   sodium chloride 0.9 % irrigation solution 3,000 mL   ceFAZolin in dextrose (iso-os) (Ancef) IVPB 2 g 2 g              Anesthesia Record               Intraprocedure I/O Totals       None           Specimen:   Order Name Source Comment Collection Info Order Time   CALCULI (STONE) ANALYSIS Urine, Clean Catch Pre-op diagnosis:  Calculus of ureter [N20.1] Collected By: Alexander Tirado MD 2024  3:15 PM     Release result to MyChart   Immediate            Staff:   Circulator: Jyotsna Medrano RN  Relief Scrub: Tahir Karimi  Scrub Person: Jeanette Kuo         Drains and/or Catheters: * None in log *    Tourniquet Times:         Implants:     Findings: 8 mm stone in lower pole calyx fragmented and extracted.  No other stone burden identified    Indications: Roscoe Man is an 59 y.o. male who is having surgery for Calculus of ureter [N20.1].  Patient previously presented to another urologist with obstructive uropathy secondary to an 8 mm stone on the left side.  He underwent stent placement and shockwave lithotripsy.  The stone did not fragment.  He presented to me for further stone treatment.  We discussed ureteroscopy.   The risk, benefits, and alternatives were discussed with the patient.  Informed consent was obtained.    Procedure Details: Patient is brought to the operating room and placed in the supine position. General anesthesia is induced. Once he is adequately anesthetized, his legs are placed in the dorsal lithotomy position. His genitalia prepped and draped in the usual sterile fashion.    A 22 Panamanian cystoscope was passed atraumatically per the urethra.  The anterior, posterior, and prostatic urethra were unremarkable.  We entered the bladder.  We identified the previously placed stent.  No other abnormalities were identified in the bladder.  Stent was grasped with a flexible grasper and brought out to the urethral meatus.  It was then exchanged under fluoroscopy for a 0.35 sensor wire and the sensor wire was advanced into the renal pelvis.  The open-ended catheter and cystoscope were then removed.  We then passed an 8/10 [place]ian dual-lumen catheter over the wire into the distal ureter.  A retrograde pyelogram was performed.  No filling defects were identified within the ureter.  There was a small filling defect in the lower pole calyx consistent with a stone.  There was moderate hydronephrosis.  A second wire was then advanced into the renal this under fluoroscopy.  2 wires were left in place.  1 wire was secured to the drape as a safety wire.  A 14 Panamanian access sheath was then advanced over the second wire into the mid ureter.  We then advanced a 9 [place]amanian flexible ureteroscope per the sheath.  We inspected the entire renal collecting system.  There was moderate hydronephrosis.  The only stone identified was a 8 mm stone in the lower pole calyx.  It was grasped with a 0 tip nitinol basket and brought into the renal pelvis.  It was then fragmented into 3 pieces using a 200 µm holmium laser fiber.  Each fragment was then basket extracted and sent to pathology.  We again inspected the renal collecting system.  No further  stone burden was identified.  We then backed the ureteroscope and sheath out of the ureter.  No stones were identified within the ureter.  The safety wire was backloaded through the cystoscope and the cystoscope was reinserted.  A 6 Armenian by 26 cm double-J stent was then advanced over the wire and into proper position.  Once in proper position, the wires were removed.  Spot fluoroscopy revealed good position of the stent.  The bladder was drained and the cystoscope was removed.  The string from the stent was affixed to the penis with Steri-Strips and a Tegaderm.  The patient was awakened and taken to the PACU in stable condition.    Complications:  None; patient tolerated the procedure well.    Disposition: PACU - hemodynamically stable.  Condition: stable     Alexander Tirado  Phone Number: [phone]

## 2024-11-08 NOTE — ANESTHESIA PROCEDURE NOTES
Airway  Date/Time: 11/8/2024 2:49 PM  Urgency: elective    Airway not difficult    Staffing  Performed: CRNA   Authorized by: Adrian Wisdom MD    Performed by: TRUDY Smallwood  Patient location during procedure: OR    Indications and Patient Condition  Indications for airway management: anesthesia  Spontaneous Ventilation: absent  Sedation level: deep  Preoxygenated: yes  Patient position: sniffing  Mask difficulty assessment: 1 - vent by mask    Final Airway Details  Final airway type: supraglottic airway      Successful airway: Supraglottic airway: AMBU.  Size 4     Number of attempts at approach: 1  Ventilation between attempts: none  Number of other approaches attempted: 0

## 2024-11-08 NOTE — DISCHARGE INSTRUCTIONS
Post-op Instructions Following Ureteroscopy   Procedure Overview   Ureteroscopy is a procedure to address kidney stones, and involves the passage of a small telescope through the urethra and bladder and up the ureter to the point where the stone is located. If the stone is small, it may be snared with a basket device and removed whole from the ureter. If the stone is large, or if the diameter of the ureter is narrow, the stone will need to be fragmented, which is usually accomplished with a laser. Once the stone is broken into tiny pieces, these pieces are removed.     General Information   Despite the fact that no skin incisions were used, the area around the ureter and bladder is raw and irritated. If you have a stent, it is foreign to the body and may irritate the bladder wall. This will cause you to have increased frequency of urination, both day and night, and an increase in the need to urinate. In some people, the urge to urinate is almost always present.   Sometimes the urge is strong enough that you may not be able to stop yourself from urinating. These problems will resolve after the stent is removed.   You should expect to see some blood in your urine, off-and-on, while the stent is in place and for a few days afterward. Do not be alarmed, even if the urine was clear for a while.     Ureteral Stent Management - THIS IS VERY IMPORTANT   These plastic stents are not permanent, the longest they may remain in place is typically 6 months. They can cause problems if they are left in for too long and require additional surgery to remove.   If our office does not contact you to arrange stone treatment, stent removal, or stent exchange within 1 week, call the office to check when you should return.   Again, these are not permanent and must be removed or exchanged within 6 months maximum.     Urine Straining   Please continue to strain your urine after surgery to catch any residual stone fragments. This is  "common, especially after a stone was \"dusted\" into very small pieces that should pass without difficulty or pain. If you capture a stone, bring it to clinic so we can analyze the stone.     Activity   Pay close attention to the color of your urine as you increase your activity. If the urine stays clear to light pink, you are doing the right amount of activity. Overexertion may cause urinary bleeding. If you start to pass clots or don't improve, call us.     Diet   You may return to your normal diet immediately. Because of the raw urinary tract surfaces, alcohol, spicy foods, and drinks with caffeine may cause some irritation or frequency of urination and should be used in moderation. To keep your urine flowing freely and to avoid constipation, drink plenty of fluids during the day (8-10 glasses.) Water is the best.     Pain Control   Tylenol and Ibuprofen are typically sufficient. Tylenol 1000 mg can be taken every 6 hours, and ibuprofen 600 mg (Motrin or Advil) can be taken every 6 hours. You can alternate these medications such that you are taking either Tylenol or ibuprofen every 3 hours.   Occasionally we will prescribe an opioid pain medication postoperatively, ONLY use this for pain not controlled by Tylenol and ibuprofen.   Aleve (naproxen) is an acceptable alternative to ibuprofen.   Tamsulosin (Flomax) is occasionally given to help with stent discomfort   Oxybutynin (Ditropan) is occasionally given to help with bladder spasms     Medication   Resume your medications unless we tell you otherwise   Use your prescribed pain medication as directed, but do not take additional Tylenol with it.     Driving   You can resume driving after you have been off of opioid pain medications for 24 hours and feel you can press on the breaks suddenly (or other similar movements) if needed.     Problems you should report to your Doctor (contact info below)   Fever greater than 100.5 degrees F   Heavy bleeding or clots "   Inability to urinate   Reactions to medication (hives, rash, nausea, vomiting, diarrhea)     Follow-up   We typically arrange your postoperative appointment for you. If you do not hear from us to schedule this appointment within 1 week after surgery, please call our office.     DR. YANCEY'S CONTACT INFORMATION   For non-urgent issues, please send our office a message via [employer], this is often easier and more convenient for you than calling the office. You should have received an invitation to sign-up for [employer] in your email upon registration.     For appointments: [phone]    For questions/issues/concerns during business hours: [phone] - this number will direct you to the voicemail for Dr. Yancey's , which is frequently checked during business hours.     For after-hours issues: [phone], this will direct you to our answering service or the resident physician on call if needed.   For medical emergencies, please call 911 or proceed to your nearest emergency room.      General Anesthesia Discharge Instructions    About this topic  You may need general anesthesia if you need to be asleep during a procedure. Your doctor will use drugs to block the signals that go from your nerves to your brain. Doctors give general anesthesia during a surgery or procedure to:  Allow you to sleep  Help your body be still  Relax your muscles  Help you to relax and be pain free  Keep you from remembering the surgery  Let the doctor manage your airway, breathing, and blood flow  The doctor or nurse anesthetist gives general anesthesia by a shot into your vein. Sometimes, you may breathe in a gas through a mask placed over your face.  What care is needed at home?  Ask your doctor what you need to do when you go home. Make sure you ask questions if you do not understand what the doctor says.  Your doctor may give you drugs to prevent or treat an upset stomach from the anesthetic. Take them as  ordered.  If your throat is sore, suck on ice chips or popsicles to ease throat pain.  Put 2 to 3 pillows under your head and back when you lie down to help you breathe easier.  For the first 24 to 48 hours:  Do not operate heavy or dangerous machinery.  Do not make major decisions or sign important papers. You may not be able to think clearly.  Avoid beer, wine, or mixed drinks.  You are at a higher risk of falling for at least 24 hours after general anesthesia.  Take extra care when you get up.  Do not change positions quickly.  Do not rush when you need to go to the bathroom or to answer the phone.  Ask for help if you feel unsteady when you try to walk.  Wear shoes with non-slip soles and low heels.  What follow-up care is needed?  Your doctor may ask you to come back to the office to check on your progress. Be sure to keep these visits.  If you have stitches that do not dissolve or staples, you will need to have them removed. Your doctor will want to do this in 1 to 2 weeks. If the doctor used skin glue, the glue will fall off on its own.  What drugs may be needed?  The doctor may order drugs to:  Help with pain  Treat an upset stomach or throwing up  Will physical activity be limited?  You will not be allowed to drive right away after the procedure. Ask a family member or a friend to drive you home.  Avoid trying to get out of bed without help until you are sure of your balance.  You may have to limit your activity. Talk to your doctor about if you need to limit how much you lift or limit exercise after your procedure.  What changes to diet are needed?  Start with a light diet when you are fully awake. This includes things that are easy to swallow like soups, pudding, jello, toast, and eggs. Slowly progress to your normal diet.  What problems could happen?  Low blood pressure  Breathing problems  Upset stomach or throwing up  Dizziness  Blood clots  Infection  When do I need to call the doctor?  Trouble  breathing  Upset stomach or throwing up more than 3 times in the next 2 days  Dizziness  Teach Back: Helping You Understand  The Teach Back Method helps you understand the information we are giving you. After you talk with the staff, tell them in your own words what you learned. This helps to make sure the staff has described each thing clearly. It also helps to explain things that may have been confusing. Before going home, make sure you can do these:  I can tell you about my procedure.  I can tell you if I need to follow up with my doctor.  I can tell you what is good for me to eat and drink the next day.  I can tell you what I would do if I have trouble breathing, an upset stomach, or dizziness.  Where can I learn more?  [employer]  https://www.nigms.nih.gov/education/pages/factsheet_Anesthesia.aspx  [facility] Choices  http://www.nhs.uk/conditions/Anaesthetic-general/Pages/Definition.aspx  Last Reviewed Date  2020-04-22

## 2024-11-08 NOTE — ANESTHESIA POSTPROCEDURE EVALUATION
Patient: Roscoe Man    Procedure Summary       Date: 11/08/24 Room / Location: [facility] / [facility]    Anesthesia Start: 1441 Anesthesia Stop: 1537    Procedure: Ureteral Lithotripsy Laser (Left: Ureter) Diagnosis:       Calculus of ureter      (Calculus of ureter [N20.1])    Surgeons: Alexander Tirado MD Responsible Provider: Adrian Wisdom MD    Anesthesia Type: general ASA Status: 3            Anesthesia Type: general    Vitals Value Taken Time   /65 11/08/24 1533   Temp 36.1 11/08/24 1538   Pulse 82 11/08/24 1536   Resp 16 11/08/24 1536   SpO2 99 % 11/08/24 1536   Vitals shown include unfiled device data.    Anesthesia Post Evaluation    Patient location during evaluation: PACU  Patient participation: waiting for patient participation  Level of consciousness: sleepy but conscious  Pain score: 0  Pain management: adequate  Airway patency: patent  Cardiovascular status: hemodynamically stable  Respiratory status: nonlabored ventilation, spontaneous ventilation, face mask and oral airway  Hydration status: acceptable  Postoperative Nausea and Vomiting: none        There were no known notable events for this encounter.

## 2024-11-11 ENCOUNTER — TELEPHONE (OUTPATIENT)
Dept: UROLOGY | Facility: CLINIC | Age: 59
End: 2024-11-11
Payer: COMMERCIAL

## 2024-11-11 NOTE — TELEPHONE ENCOUNTER
Spoke with pt, advised stent can be removed now. Also reminded pt of renal US to be done in 1 month, prior to follow up appointment on 12/17. Voiced understanding with no further questions/concerns at this time.

## 2024-11-14 ENCOUNTER — APPOINTMENT (OUTPATIENT)
Dept: UROLOGY | Facility: CLINIC | Age: 59
End: 2024-11-14
Payer: COMMERCIAL

## 2024-11-14 LAB
APPEARANCE STONE: NORMAL
COMPN STONE: NORMAL
SPECIMEN WT: 30 MG

## 2024-11-15 ENCOUNTER — TELEPHONE (OUTPATIENT)
Dept: UROLOGY | Facility: CLINIC | Age: 59
End: 2024-11-15
Payer: COMMERCIAL

## 2024-11-15 NOTE — TELEPHONE ENCOUNTER
Pt is calling to speak with Ely ivory: his new prescribed med, he wants to switch it back and he expressed that he is not appreciative of the fact that he has to go through me, Ely's admin, to get this done. He wants a call back   Saucerization Depth: dermis and superficial adipose tissue

## 2024-12-17 ENCOUNTER — APPOINTMENT (OUTPATIENT)
Dept: UROLOGY | Facility: CLINIC | Age: 59
End: 2024-12-17
Payer: COMMERCIAL

## 2025-01-15 ENCOUNTER — APPOINTMENT (OUTPATIENT)
Dept: UROLOGY | Facility: CLINIC | Age: 60
End: 2025-01-15
Payer: COMMERCIAL

## 2025-04-28 ENCOUNTER — APPOINTMENT (OUTPATIENT)
Dept: UROLOGY | Facility: CLINIC | Age: 60
End: 2025-04-28
Payer: COMMERCIAL

## 2025-04-28 VITALS — DIASTOLIC BLOOD PRESSURE: 84 MMHG | SYSTOLIC BLOOD PRESSURE: 153 MMHG | HEART RATE: 83 BPM

## 2025-04-28 DIAGNOSIS — N13.8 BPH WITH URINARY OBSTRUCTION: ICD-10-CM

## 2025-04-28 DIAGNOSIS — N20.0 KIDNEY STONE: Primary | ICD-10-CM

## 2025-04-28 DIAGNOSIS — N52.9 MALE ERECTILE DISORDER: ICD-10-CM

## 2025-04-28 DIAGNOSIS — N40.1 BPH WITH URINARY OBSTRUCTION: ICD-10-CM

## 2025-04-28 LAB
POC APPEARANCE, URINE: CLEAR
POC BILIRUBIN, URINE: NEGATIVE
POC BLOOD, URINE: ABNORMAL
POC COLOR, URINE: YELLOW
POC GLUCOSE, URINE: NEGATIVE MG/DL
POC KETONES, URINE: NEGATIVE MG/DL
POC LEUKOCYTES, URINE: NEGATIVE
POC NITRITE,URINE: NEGATIVE
POC PH, URINE: 5.5 PH
POC PROTEIN, URINE: NEGATIVE MG/DL
POC SPECIFIC GRAVITY, URINE: 1.02
POC UROBILINOGEN, URINE: 0.2 EU/DL

## 2025-04-28 PROCEDURE — 99214 OFFICE O/P EST MOD 30 MIN: CPT | Performed by: NURSE PRACTITIONER

## 2025-04-28 PROCEDURE — 3077F SYST BP >= 140 MM HG: CPT | Performed by: NURSE PRACTITIONER

## 2025-04-28 PROCEDURE — 3079F DIAST BP 80-89 MM HG: CPT | Performed by: NURSE PRACTITIONER

## 2025-04-28 PROCEDURE — 81003 URINALYSIS AUTO W/O SCOPE: CPT | Performed by: NURSE PRACTITIONER

## 2025-04-28 PROCEDURE — G2211 COMPLEX E/M VISIT ADD ON: HCPCS | Performed by: NURSE PRACTITIONER

## 2025-04-28 PROCEDURE — 4010F ACE/ARB THERAPY RXD/TAKEN: CPT | Performed by: NURSE PRACTITIONER

## 2025-04-28 RX ORDER — LOSARTAN POTASSIUM 25 MG/1
25 TABLET ORAL
COMMUNITY
Start: 2025-03-31

## 2025-04-28 RX ORDER — TADALAFIL 20 MG/1
20 TABLET ORAL
Qty: 30 TABLET | Refills: 3 | Status: SHIPPED | OUTPATIENT
Start: 2025-04-28 | End: 2025-12-24

## 2025-04-28 RX ORDER — SILODOSIN 8 MG/1
8 CAPSULE ORAL NIGHTLY
Qty: 90 CAPSULE | Refills: 3 | Status: SHIPPED | OUTPATIENT
Start: 2025-04-28 | End: 2026-04-28

## 2025-04-28 RX ORDER — TADALAFIL 5 MG/1
5 TABLET ORAL DAILY
Qty: 90 TABLET | Refills: 3 | Status: SHIPPED | OUTPATIENT
Start: 2025-04-28 | End: 2026-04-23

## 2025-04-28 NOTE — PROGRESS NOTES
Subjective   Patient ID: Roscoe Man is a 59 y.o. male who presents for BPH/ ED.  Follow up of BPH and ED . He developed postoperative retention in Nov 2020 after surgical resolution of subdural hematoma. He has been compliant with daily silodosin as well as tadalafil. He was doing ISC temporarily which he has not completed since Dec 2020. Significant improvement in strength of stream and ability to empty. Doing well with silodosin AM and tadalafil PM.      Previously having priapism with 10/30/1 up to 30 units. Using 20/30/2 up to 30 units lasting 1.5 hours  Adequate for erections.     PSA 4.6 in Oct 2023. Managed by age management MD who also manages his testosterone replacement. Currently injecting 0.7 mL weekly.      CT in left ureter, 8mm obstructing stone noted at the end of August 2024. No follow up stones noted on imaging.                Review of Systems   All other systems reviewed and are negative.      Objective   Physical Exam  Vitals reviewed.     Alert and oriented x3  Moist mucous membranes  Breathes easily on room air  Abdomen soft, nondistended. Obese  No edema  No scleral icterus  No focal neurological deficits  Appears stated age, no acute distress    Office Visit on 04/28/2025   Component Date Value Ref Range Status    POC Color, Urine 04/28/2025 Yellow  Straw, Yellow, Light-Yellow Final    POC Appearance, Urine 04/28/2025 Clear  Clear Final    POC Glucose, Urine 04/28/2025 NEGATIVE  NEGATIVE mg/dl Final    POC Bilirubin, Urine 04/28/2025 NEGATIVE  NEGATIVE Final    POC Ketones, Urine 04/28/2025 NEGATIVE  NEGATIVE mg/dl Final    POC Specific Gravity, Urine 04/28/2025 1.025  1.005 - 1.035 Final    POC Blood, Urine 04/28/2025 TRACE-Intact (A)  NEGATIVE Final    POC PH, Urine 04/28/2025 5.5  No Reference Range Established PH Final    POC Protein, Urine 04/28/2025 NEGATIVE  NEGATIVE mg/dl Final    POC Urobilinogen, Urine 04/28/2025 0.2  0.2, 1.0 EU/DL Final    Poc Nitrite, Urine 04/28/2025 NEGATIVE   NEGATIVE Final    POC Leukocytes, Urine 04/28/2025 NEGATIVE  NEGATIVE Final         Assessment/Plan   Diagnoses and all orders for this visit:  Kidney stone  -     US renal complete; Future  BPH with urinary obstruction  -     POCT UA Automated manually resulted  -     tadalafil (Cialis) 5 mg tablet; Take 1 tablet (5 mg) by mouth once daily.  -     tadalafil 20 mg tablet; Take 1 tablet (20 mg) by mouth every other day if needed for erectile dysfunction.  -     silodosin (Rapaflo) 8 mg capsule; Take 1 capsule (8 mg) by mouth once daily at bedtime.  Male erectile disorder    Patient doing moderately well. Continue with current medications. Will follow up with kidney ultrasound in 6 months. Refill ICI. Patient is in agreement with plan.        DEBBIE Jennings-CNP 04/28/25 8:59 AM

## 2025-11-07 ENCOUNTER — APPOINTMENT (OUTPATIENT)
Dept: UROLOGY | Facility: CLINIC | Age: 60
End: 2025-11-07
Payer: COMMERCIAL

## (undated) DEVICE — CATHETER, DUAL LUMEN, UDC/10/50 M

## (undated) DEVICE — GOWN, SURGICAL, ROYAL SILK, XL, STERILE

## (undated) DEVICE — Device

## (undated) DEVICE — PRESSURE INFUSOR, 1000 CC, DISPOSABLE

## (undated) DEVICE — CATHETER, URETERAL, POLLACK, OPEN END, 5.5 FR, 70 CM

## (undated) DEVICE — HOLSTER, ELECTROSURGERY ACCESSORY, STERILE

## (undated) DEVICE — GLOVE, SURGICAL, PROTEXIS PI , 7.5, PF, LF

## (undated) DEVICE — STRIP, SKIN CLOSURE, STERI STRIP, REINFORCED, 0.5 X 4 IN

## (undated) DEVICE — Y-ADAPTER, GATEWAY ADVANTAGE

## (undated) DEVICE — SCOPE, LITHOVUE, STANDARD WITH EMPOWER

## (undated) DEVICE — SOLUTION, INJECTION, USP, SODIUM CHLORIDE 0.9%, .9, NACL, 1000 ML, BAG

## (undated) DEVICE — SOLUTION, IRRIGATION, STERILE WATER, 1000 ML, POUR BOTTLE

## (undated) DEVICE — PROTECTOR, NERVE, ULNAR, PINK

## (undated) DEVICE — DRAINBAG, 15.5 X 31, 2600/2800 UROVIEW, STERILE

## (undated) DEVICE — SHEATH, URETERAL ACCESS, NAVIGATOR 12/14FR X 46CM

## (undated) DEVICE — DRESSING, TRANSPARENT, TEGADERM, 2-3/8 X 2-3/4 IN

## (undated) DEVICE — GLOVE, SURGICAL, PROTEXIS PI , 6.5, PF, LF

## (undated) DEVICE — TOWEL PACK, STERILE, 16X24, XRAY DETECTABLE, BLUE, 4/PK

## (undated) DEVICE — SOLUTION, IRRIGATION, USP, STERILE WATER, UROLOGICAL, 3000 ML, BAG

## (undated) DEVICE — CUFF, BP LARGE SOFT ADULT, DISPOSABLE

## (undated) DEVICE — TRAY, SKIN SCRUB, WET PREP, WITH 4 COMPARMENT

## (undated) DEVICE — GUIDEWIRE, DUAL SENSOR, .035 X 150 STRAIGHT,  3CM

## (undated) DEVICE — TUBING, SUCTION, 6MM X 10, CLEAN N-COND

## (undated) DEVICE — BASKET, STONE, RETRIEVAL, NITINOL (4WIRE, 1.9 0 TIP)

## (undated) DEVICE — HOLMIUM MP 200 FORTEC FIBER

## (undated) DEVICE — COVER, FOOTSWITCH, PEDAL, WIDE

## (undated) DEVICE — BOWL, BASIN, 32 OZ, STERILE

## (undated) DEVICE — SYRINGE, 10 CC, LUER LOCK